# Patient Record
Sex: MALE
[De-identification: names, ages, dates, MRNs, and addresses within clinical notes are randomized per-mention and may not be internally consistent; named-entity substitution may affect disease eponyms.]

---

## 2018-10-03 ENCOUNTER — HOSPITAL ENCOUNTER (OUTPATIENT)
Dept: HOSPITAL 31 - C.CATHLAB | Age: 73
Discharge: HOME | End: 2018-10-03
Attending: INTERNAL MEDICINE
Payer: MEDICARE

## 2018-10-03 DIAGNOSIS — G45.9: Primary | ICD-10-CM

## 2018-10-03 DIAGNOSIS — I07.1: ICD-10-CM

## 2018-10-03 DIAGNOSIS — I34.0: ICD-10-CM

## 2018-10-03 LAB
APTT BLD: 37 SECONDS (ref 21–34)
BUN SERPL-MCNC: 13 MG/DL (ref 9–20)
CALCIUM SERPL-MCNC: 9.2 MG/DL (ref 8.6–10.4)
ERYTHROCYTE [DISTWIDTH] IN BLOOD BY AUTOMATED COUNT: 13.8 % (ref 11.5–14.5)
GFR NON-AFRICAN AMERICAN: > 60
HGB BLD-MCNC: 14.6 G/DL (ref 12–18)
INR PPP: 1
MCH RBC QN AUTO: 32.4 PG (ref 27–31)
MCHC RBC AUTO-ENTMCNC: 33.9 G/DL (ref 33–37)
MCV RBC AUTO: 95.5 FL (ref 80–94)
PLATELET # BLD: 200 K/UL (ref 130–400)
PMV BLD AUTO: 8.5 FL (ref 7.2–11.7)
PROTHROMBIN TIME: 11.4 SECONDS (ref 9.7–12.2)
RBC # BLD AUTO: 4.51 MIL/UL (ref 4.4–5.9)
WBC # BLD AUTO: 7.8 K/UL (ref 4.8–10.8)

## 2018-10-03 PROCEDURE — 82948 REAGENT STRIP/BLOOD GLUCOSE: CPT

## 2018-10-03 PROCEDURE — 85730 THROMBOPLASTIN TIME PARTIAL: CPT

## 2018-10-03 PROCEDURE — 36415 COLL VENOUS BLD VENIPUNCTURE: CPT

## 2018-10-03 PROCEDURE — 93312 ECHO TRANSESOPHAGEAL: CPT

## 2018-10-03 PROCEDURE — 85027 COMPLETE CBC AUTOMATED: CPT

## 2018-10-03 PROCEDURE — 85610 PROTHROMBIN TIME: CPT

## 2018-10-03 PROCEDURE — 80048 BASIC METABOLIC PNL TOTAL CA: CPT

## 2018-10-03 NOTE — CARD
--------------- APPROVED REPORT --------------





Date of service: 10/03/2018



EXAM: Transesophageal echocardiogram with color flow Doppler.



INDICATION

CVA/TIA 



Mitral Valve

E/A ratio0.0



TDI

E/Lateral E'0.0E/Medial E'0.0



 LEFT VENTRICLE 

The left ventricle is normal size.

There is normal left ventricular wall thickness. 

The left ventricular function is moderately reduced, with diffuse 

hypokinesis. 

The left ventricular ejection fraction is visualy about 40%.

No regional wall motion abnormalities noted.

Transmitral Doppler flow pattern is Grade II-pseudonormal filling 

dynamics.

No left ventricle thrombus noted on this study.

There is no ventricular septal defect visualized.

There is no left ventricular aneurysm. 

There is no mass noted in the left ventricle.



 RIGHT VENTRICLE 

The right ventricle is normal size.

There is normal right ventricular wall thickness.

The right ventricular systolic function is mildly reduced.



 ATRIA 

The left atrium size is dilated

The right atrium size is dilated

The interatrial septum is intact with no evidence for an atrial 

septal defect. Injection of agitated saline failed to demonstrate an 

intra-cardiac shunt.



 AORTIC VALVE 

The aortic valve is normal in structure and function.

No aortic regurgitation is present. 

There is no aortic valvular stenosis. 

There is no aortic valvular vegetation.



 MITRAL VALVE 

The mitral valve is normal in structure and function.

There is no evidence of mitral valve prolapse.

There is no mitral valve stenosis. 

There is mild mitral valve regurgitation noted.



 TRICUSPID VALVE 

The tricuspid valve is normal in structure and function.

There is no tricuspid valve regurgitation noted.

There is no tricuspid valve prolapse or vegetation.

There is no tricuspid valve stenosis. 



 PULMONIC VALVE 

The pulmonary valve is normal in structure and function.

There is no pulmonic valvular regurgitation. 

There is no pulmonic valvular stenosis.



 GREAT VESSELS 

The aortic root is normal in size.

The ascending aorta is normal in size. There are several areas of 

mixed soft and calcified irregular plaque, maximal .6 cm in height. 

The pulmonary artery is normal.



 PERICARDIAL EFFUSION 

The pericardium appears normal.

There is no pleural effusion.



<Conclusion>

Transmitral Doppler flow pattern is Grade II-

The left ventricular function is moderately reduced, with diffuse 

hypokinesis. 

The left ventricular ejection fraction is visualy about 40%.

The right ventricular systolic function is mildly reduced.

The left atrium size is dilated

The right atrium size is dilated

The interatrial septum is intact with no evidence for an atrial 

septal defect. Injection of agitated saline failed to demonstrate an 

intra-cardiac shunt.

There is mild mitral valve regurgitation noted.

There are several areas of mixed soft and calcified irregular plaque, 

maximal .6 cm in height.

## 2018-10-04 VITALS — OXYGEN SATURATION: 100 % | RESPIRATION RATE: 18 BRPM
